# Patient Record
Sex: FEMALE | Race: WHITE | Employment: FULL TIME | URBAN - METROPOLITAN AREA
[De-identification: names, ages, dates, MRNs, and addresses within clinical notes are randomized per-mention and may not be internally consistent; named-entity substitution may affect disease eponyms.]

---

## 2019-01-03 ENCOUNTER — OFFICE VISIT (OUTPATIENT)
Dept: SLEEP MEDICINE | Age: 54
End: 2019-01-03

## 2019-01-03 VITALS
WEIGHT: 199 LBS | HEIGHT: 69 IN | OXYGEN SATURATION: 97 % | BODY MASS INDEX: 29.47 KG/M2 | SYSTOLIC BLOOD PRESSURE: 110 MMHG | DIASTOLIC BLOOD PRESSURE: 76 MMHG | HEART RATE: 80 BPM

## 2019-01-03 DIAGNOSIS — G47.00 INSOMNIA WITH SLEEP APNEA: Primary | ICD-10-CM

## 2019-01-03 DIAGNOSIS — Z86.59 H/O: DEPRESSION: ICD-10-CM

## 2019-01-03 DIAGNOSIS — G47.30 INSOMNIA WITH SLEEP APNEA: Primary | ICD-10-CM

## 2019-01-03 RX ORDER — FLUOXETINE 20 MG/1
20 TABLET ORAL DAILY
COMMUNITY

## 2019-01-03 RX ORDER — LEVOTHYROXINE SODIUM 25 UG/1
TABLET ORAL
COMMUNITY

## 2019-01-03 NOTE — PATIENT INSTRUCTIONS
[address] [facility] [place]. [place], [address]  Tel.  [phone]  Fax. 100 [facility] 60  [place], [address] [facility]  Tel.  [phone]  Fax. [phone] [address]  Tel.  [phone]  Fax. [phone]     Learning About CPAP for Sleep Apnea  What is CPAP? CPAP is a small machine that you use at home every night while you sleep. It increases air pressure in your throat to keep your airway open. When you have sleep apnea, this can help you sleep better so you feel much better. CPAP stands for \"continuous positive airway pressure. \"  The CPAP machine will have one of the following:  · A mask that covers your nose and mouth  · Prongs that fit into your nose  · A mask that covers your nose only, the most common type. This type is called NCPAP. The N stands for \"nasal.\"  Why is it done? CPAP is usually the best treatment for obstructive sleep apnea. It is the first treatment choice and the most widely used. Your doctor may suggest CPAP if you have:  · Moderate to severe sleep apnea. · Sleep apnea and coronary artery disease (CAD) or heart failure. How does it help? · CPAP can help you have more normal sleep, so you feel less sleepy and more alert during the daytime. · CPAP may help keep heart failure or other heart problems from getting worse. · NCPAP may help lower your blood pressure. · If you use CPAP, your bed partner may also sleep better because you are not snoring or restless. What are the side effects? Some people who use CPAP have:  · A dry or stuffy nose and a sore throat. · Irritated skin on the face. · Sore eyes. · Bloating. If you have any of these problems, work with your doctor to fix them. Here are some things you can try:  · Be sure the mask or nasal prongs fit well. · See if your doctor can adjust the pressure of your CPAP. · If your nose is dry, try a humidifier.   · If your nose is runny or stuffy, try decongestant medicine or a steroid nasal spray. If these things do not help, you might try a different type of machine. Some machines have air pressure that adjusts on its own. Others have air pressures that are different when you breathe in than when you breathe out. This may reduce discomfort caused by too much pressure in your nose. Where can you learn more? Go to e-Tag.be  Enter Dimple Rubinstein in the search box to learn more about \"Learning About CPAP for Sleep Apnea. \"   © 6204-8777 Healthwise, Incorporated. Care instructions adapted under license by [facility] [employer] (which disclaims liability or warranty for this information). This care instruction is for use with your licensed healthcare professional. If you have questions about a medical condition or this instruction, always ask your healthcare professional. [address] any warranty or liability for your use of this information. Content Version: 9.1.06180; Last Revised: January 11, 2010  PROPER SLEEP HYGIENE    What to avoid  · Do not have drinks with caffeine, such as coffee or black tea, for 8 hours before bed. · Do not smoke or use other types of tobacco near bedtime. Nicotine is a stimulant and can keep you awake. · Avoid drinking alcohol late in the evening, because it can cause you to wake in the middle of the night. · Do not eat a big meal close to bedtime. If you are hungry, eat a light snack. · Do not drink a lot of water close to bedtime, because the need to urinate may wake you up during the night. · Do not read or watch TV in bed. Use the bed only for sleeping and sexual activity. What to try  · Go to bed at the same time every night, and wake up at the same time every morning. Do not take naps during the day. · Keep your bedroom quiet, dark, and cool. · Get regular exercise, but not within 3 to 4 hours of your bedtime. .  · Sleep on a comfortable pillow and mattress.   · If watching the clock makes you anxious, turn it facing away from you so you cannot see the time. · If you worry when you lie down, start a worry book. Well before bedtime, write down your worries, and then set the book and your concerns aside. · Try meditation or other relaxation techniques before you go to bed. · If you cannot fall asleep, get up and go to another room until you feel sleepy. Do something relaxing. Repeat your bedtime routine before you go to bed again. · Make your house quiet and calm about an hour before bedtime. Turn down the lights, turn off the TV, log off the computer, and turn down the volume on music. This can help you relax after a busy day. Drowsy Driving: The [employer] cites drowsiness as a causing factor in more than 619,225 police reported crashes annually, resulting in 76,000 injuries and 1,500 deaths. Other surveys suggest 55% of people polled have driven while drowsy in the past year, 23% had fallen asleep but not crashed, 3% crashed, and 2% had and accident due to drowsy driving. Who is at risk? Young Drivers: One study of drowsy driving accidents states that 55% of the drivers were under 25 years. Of those, 75% were male. Shift Workers and Travelers: People who work overnight or travel across time zones frequently are at higher risk of experiencing Circadian Rhythm Disorders. They are trying to work and function when their body is programed to sleep. Sleep Deprived: Lack of sleep has a serious impact on your ability to pay attention or focus on a task. Consistently getting less than the average of 8 hours your body needs creates partial or cumulative sleep deprivation. Untreated Sleep Disorders: Sleep Apnea, Narcolepsy, R.L.S., and other sleep disorders (untreated) prevent a person from getting enough restful sleep. This leads to excessive daytime sleepiness and increases the risk for drowsy driving accidents by up to 7 times.   Medications / Alcohol: Even over the counter medications can cause drowsiness. Medications that impair a drivers attention should have a warning label. Alcohol naturally makes you sleepy and on its own can cause accidents. Combined with excessive drowsiness its effects are amplified. Signs of Drowsy Driving:   * You don't remember driving the last few miles   * You may drift out of your tam   * You are unable to focus and your thoughts wander   * You may yawn more often than normal   * You have difficulty keeping your eyes open / nodding off   * Missing traffic signs, speeding, or tailgating  Prevention-   Good sleep hygiene, lifestyle and behavioral choices have the most impact on drowsy driving. There is no substitute for sleep and the average person requires 8 hours nightly. If you find yourself driving drowsy, stop and sleep. Consider the sleep hygiene tips provided during your visit as well. Medication Refill Policy: Refills for all medications require 1 week advance notice. Please have your pharmacy fax a refill request. We are unable to fax, or call in \"controled substance\" medications and you will need to pick these prescriptions up from our office. Who@ Activation    Thank you for requesting access to Who@. Please follow the instructions below to securely access and download your online medical record. Who@ allows you to send messages to your doctor, view your test results, renew your prescriptions, schedule appointments, and more. How Do I Sign Up? 1. In your internet browser, go to [email or web address]. 2. Click on the First Time User? Click Here link in the Sign In box. You will see the New Member Sign Up page. 3. Enter your Who@ Access Code exactly as it appears below. You will not need to use this code after youve completed the sign-up process. If you do not sign up before the expiration date, you must request a new code.     Who@ Access Code: [email or web address]  Expires: 4/3/2019  4:20 PM (This is the date your Reclamador access code will )    4. Enter the last four digits of your Social Security Number (xxxx) and Date of Birth (mm/dd/yyyy) as indicated and click Submit. You will be taken to the next sign-up page. 5. Create a Reclamador ID. This will be your Reclamador login ID and cannot be changed, so think of one that is secure and easy to remember. 6. Create a Reclamador password. You can change your password at any time. 7. Enter your Password Reset Question and Answer. This can be used at a later time if you forget your password. 8. Enter your e-mail address. You will receive e-mail notification when new information is available in [address] 9. Click Sign Up. You can now view and download portions of your medical record. 10. Click the Download Summary menu link to download a portable copy of your medical information. Additional Information    If you have questions, please call [phone]. Remember, Reclamador is NOT to be used for urgent needs. For medical emergencies, dial 911.

## 2019-01-03 NOTE — PROGRESS NOTES
217 [facility] [place]. [place], [address]  Tel.  [phone]  Fax. 100 [facility] 60  [place], [address] [facility]  Tel.  [phone]  Fax. [phone] [address]  Tel.  [phone]  Fax. [phone]         Subjective:      Stephy Dobbs is an 47 y.o. female referred for evaluation for a sleep disorder. She complains of snoring associated with periods of not breathing, excessive daytime sleepiness. Symptoms began several years ago, she was diagnosed with CLIFFORD in 2004 and has been on CPAP Therapy (see media for download) since that time. She reports of using her current device since 2012, this is a fixed pressure device and she has gained about 15 lbs in the past 6 years. She reports of waking up between 2 to 4 am and is unable to return to sleep with CPAP in use. She usually can fall asleep initially in 10 minutes. Family or house members note snoring. She denies completely or partially paralyzed while falling asleep or waking up. Stephy Dobbs does not wake up frequently at night. She is bothered by waking up too early and left unable to get back to sleep. She actually sleeps about 8 hours at night and wakes up about 3 times during the night. She does not work shifts: Karina Gillis indicates she does not get too little sleep at night. Her bedtime is 2130. She awakens at 0630. She does take naps. She takes  naps a week lasting 1, Hour(s). She has the following observed behaviors: Loud snoring, Light snoring, Pauses in breathing;  . Other remarks: Waking with a gasp or snort    Beaver Sleepiness Score: 6     No Known Allergies      Current Outpatient Medications:     FLUoxetine (PROZAC) 20 mg tablet, Take 20 mg by mouth daily. , Disp: , Rfl:     levothyroxine (SYNTHROID) 25 mcg tablet, Take  by mouth Daily (before breakfast). , Disp: , Rfl:      She  has a past medical history of Depression and Hearing loss.     She  has a past surgical history that includes hx thyroidectomy. She family history includes Cancer in her mother; Sleep Apnea in her father; Suicide in her father. She  reports that  has never smoked. she has never used smokeless tobacco. She reports that she does not use drugs. Review of Systems:  Constitutional: significant weight gain - 15 lbs  Eyes:  No blurred vision  CVS:  No significant chest pain  Pulm:  No significant shortness of breath  GI:  No significant nausea or vomiting  :  No significant nocturia  Musculoskeletal:  No significant joint pain at night  Skin:  No significant rashes  Neuro:  No significant dizziness   Psych:  No active mood issues    Sleep Review of Systems: notable for no difficulty falling asleep; infrequent awakenings at night;  regular dreaming noted; no nightmares ; no early morning headaches; no memory problems; no concentration issues; no history of any automobile or occupational accidents due to daytime drowsiness. Objective:     Visit Vitals  /76   Pulse 80   Ht 5' 9\" (1.753 m)   Wt 199 lb (90.3 kg)   SpO2 97%   BMI 29.39 kg/m²         General:   Not in acute distress   Eyes:  Anicteric sclerae, no obvious strabismus   Nose:  No obvious nasal septum deviation    Oropharynx:   Class 2 oropharyngeal outlet, thick tongue base, uvula could not be seen due to low-lying soft palate, narrow tonsilo-pharyngeal pilars   Tonsils:   tonsils are not seen due to low-lying soft palate   Neck:    ; midline trachea   Chest/Lungs:  Equal lung expansion, clear on auscultation    CVS:  Normal rate, regular rhythm; no JVD   Skin:  Warm to touch; no obvious rashes   Neuro:  No focal deficits ; no obvious tremor    Psych:  Normal affect,  normal countenance;          Assessment:       ICD-10-CM ICD-9-CM    1. Insomnia with sleep apnea G47.00 780.51 SLEEP STUDY UNATTENDED, 4 CHANNEL    G47.30     2. BMI 29.0-29.9,adult Z68.29 V85.25    3.  H/O: depression Z86.59 V11.8          Plan:     * The patient currently has a Low Risk for having sleep apnea. STOP-BANG score 3.  * Sleep testing was ordered for initial evaluation. * She was provided information on sleep apnea including coresponding risk factors and the importance of proper treatment. * Treatment options if indicated were reviewed today. Patient agrees to a trial of PAP therapy if indicated. * Counseling was provided regarding proper sleep hygiene (including effect of light on sleep), stimulus control, sleep environment safety and safe driving. * Effect of sleep disturbance on weight was reviewed. We have recommended a dedicated weight loss through appropriate diet and an exercise regiment as significant weight reduction has been shown to reduce severity of obstructive sleep apnea. * Patient agrees to telephone [phone]  follow-up by myself or lead sleep technologist shortly after sleep study to review results and plan final management.     (patient has given permission for a message to be left regarding test results and further management if patient cannot be cannot be reached directly). Thank you for allowing us to participate in your patient's medical care. We'll keep you updated on these investigations. Jerad Cid MD, FAASM  Electronically signed.  01/03/19

## 2019-01-04 ENCOUNTER — TELEPHONE (OUTPATIENT)
Dept: SLEEP MEDICINE | Age: 54
End: 2019-01-04

## 2019-01-08 ENCOUNTER — OFFICE VISIT (OUTPATIENT)
Dept: SLEEP MEDICINE | Age: 54
End: 2019-01-08

## 2019-01-08 DIAGNOSIS — G47.33 OSA (OBSTRUCTIVE SLEEP APNEA): Primary | ICD-10-CM

## 2019-01-08 NOTE — PROGRESS NOTES
[address] [facility] [address] [facility] [address] [place]. [place], [address]  Tel.  [phone]  Fax. 100 [facility] 60  [address] [facility] [address], [address] [facility]  Tel.  [phone]  Fax. [phone] [facility]  [place]   Tel.  [phone]  Fax. [phone]       S>Mirela Mendoza is a 47 y.o. female seen today to receive a home sleep testing unit (HST). · Patient was educated on proper hookup and operation of the HST. · Instruction forms and documentation were reviewed and signed. · The patient demonstrated good understanding of the HST.    O>    There were no vitals taken for this visit. A>  1. CLIFFORD (obstructive sleep apnea)          P>  · General information regarding operations and maintenance of the device was provided. · She was provided information on sleep apnea including coresponding risk factors and the importance of proper treatment. · Follow-up appointment was made to return the HST. She will be contacted once the results have been reviewed. · She was asked to contact our office for any problems regarding her home sleep test study.

## 2019-01-10 ENCOUNTER — HOSPITAL ENCOUNTER (OUTPATIENT)
Dept: SLEEP MEDICINE | Age: 54
Discharge: HOME | End: 2019-01-10
Payer: COMMERCIAL

## 2019-01-10 PROCEDURE — 95806 SLEEP STUDY UNATT&RESP EFFT: CPT | Performed by: INTERNAL MEDICINE

## 2019-01-15 ENCOUNTER — TELEPHONE (OUTPATIENT)
Dept: SLEEP MEDICINE | Age: 54
End: 2019-01-15

## 2019-01-15 DIAGNOSIS — G47.33 OSA (OBSTRUCTIVE SLEEP APNEA): Primary | ICD-10-CM

## 2019-01-15 NOTE — TELEPHONE ENCOUNTER
HSAT Returned - [facility]    Date of Study: 1/10/19    Study log in media

## 2019-01-15 NOTE — TELEPHONE ENCOUNTER
Maverick Gonzalez is to be contacted by lead sleep technologist regarding results of Sleep Testing which was indicative of an average AHI of 32 per hour with an SpO2 caren of 85% and SpO2 of < 88% being 3 minutes. An APAP prescription has been written and patient will be contacted by office staff regarding follow-up  in 2-3 months after initiation of therapy. Encounter Diagnosis   Name Primary?  CLIFFORD (obstructive sleep apnea) Yes       Orders Placed This Encounter    AMB SUPPLY ORDER     Diagnosis: Obstructive Sleep Apnea ICD-10 Code (G47.33)    Positive Airway Pressure Therapy: Duration of need: 99 months. ResMed APAP Device: Minimum Pressure: 4 cmH2O, Maximum Pressure: 20 cmH2O. Ramp Time: 30 Minutes. EPR: 2. CPAP mask -  Patient preference, headgear, tubing, and filter;  heated humidifier; wireless modem. Remote monitoring enrollment. Juhi Ghotra MD, FAA; NPI: [id number]  Electronically signed. 01/15/19

## 2019-01-24 ENCOUNTER — TELEPHONE (OUTPATIENT)
Dept: SLEEP MEDICINE | Age: 54
End: 2019-01-24

## 2019-01-24 DIAGNOSIS — G47.33 OSA (OBSTRUCTIVE SLEEP APNEA): Primary | ICD-10-CM

## 2019-01-24 NOTE — TELEPHONE ENCOUNTER
The patient called today, [facility] The patient is wanting a \"Travel\" CPAP for hew to use. [facility], to the patient, the the words \"replacement travel CPAP\" be placed on the prescription for this to be covered. Can you please add this verbiage to the current prescription?

## 2019-01-30 ENCOUNTER — DOCUMENTATION ONLY (OUTPATIENT)
Dept: SLEEP MEDICINE | Age: 54
End: 2019-01-30

## 2019-01-30 NOTE — TELEPHONE ENCOUNTER
Orders Placed This Encounter    AMB SUPPLY ORDER     Diagnosis: (G47.33) CLIFFORD (obstructive sleep apnea)  (primary encounter diagnosis)     Positive Airway Pressure Therapy: Duration of need: 99 months. Respironics GO \"repalcement travel APAP\" ( Unit - Auto set Mode): Auto - PAP: 6 - 12 cmH2O; Optistart enabled. Ramp Time: 30 Minutes; Flex: 2. Remote monitoring enrollment.  Heated Humidifier     Oral/Nasal Combo Mask 1 every 3 months.  Oral Cushion Combo Mask (Replace) 2 per month.  Nasal Pillows Combo Mask (Replace) 2 per month.  Full Face Mask 1 every 3 months.  Full Face Mask Cushion 1 per month.  Nasal Cushion (Replace) 2 per month.  Nasal Pillows (Replace) 2 per month.  Nasal Interface Mask 1 every 3 months.  Headgear 1 every 6 months.  Chinstrap 1 every 6 months.  Tubing 1 every 3 months.  Filter(s) Disposable 2 per month.  Filter(s) Non-Disposable 1 every 6 months.  Oral Interface 1 every 3 months. [address] for [employer] (Replace) 1 every 6 months.  Tubing with heating element 1 every 3 months. Perform Mask Fitting per patient preference and comfort - replace as above. Veronique Rousseau MD, FAASM; NPI: [id number]  Electronically signed. 01/30/19

## 2019-02-20 ENCOUNTER — TELEPHONE (OUTPATIENT)
Dept: SLEEP MEDICINE | Age: 54
End: 2019-02-20

## 2019-02-20 NOTE — TELEPHONE ENCOUNTER
[facility] saying [facility] and gave auth to Duane Pineda. They said they will call patient to make sure she was set up on supplies.

## 2019-04-15 ENCOUNTER — DOCUMENTATION ONLY (OUTPATIENT)
Dept: SLEEP MEDICINE | Age: 54
End: 2019-04-15

## 2019-04-15 NOTE — PROGRESS NOTES
Patient cancelled 1st adherence appointment on 4/16/19 and will call back to reschedule. Patient informed that she will need a follow-up in the future due to CPAP therapy. Patient expressed understanding.

## 2021-04-19 ENCOUNTER — TRANSCRIBE ORDER (OUTPATIENT)
Dept: SCHEDULING | Age: 56
End: 2021-04-19

## 2021-04-19 DIAGNOSIS — Z78.0 POST-MENOPAUSE: Primary | ICD-10-CM
